# Patient Record
Sex: MALE | URBAN - METROPOLITAN AREA
[De-identification: names, ages, dates, MRNs, and addresses within clinical notes are randomized per-mention and may not be internally consistent; named-entity substitution may affect disease eponyms.]

---

## 2020-04-08 ENCOUNTER — TELEMEDICINE CONVERTED (OUTPATIENT)
Dept: NEUROLOGY | Facility: CLINIC | Age: 25
End: 2020-04-08
Attending: PSYCHIATRY & NEUROLOGY

## 2021-05-10 NOTE — H&P
History and Physical      Patient Name: Pete Carmona   Patient ID: [id number]   Sex: Male   YOB: 1995        Visit Date: April 8, 2020    Provider: Haider Martínez MD   Location: [facility]   Location Address: [address] [place], [place] [address]  [id number]   Location Phone: [phone]          Chief Complaint     New pt via Zoom for headaches.       History Of Present Illness  Pete Carmona is a 24 year old male who presents today to [facility] today referred from Chester Asif MD.      24-year-old man telemetry video valuation for headaches and apraxia.  He states that he had a headache for 2 weeks and it went away.  Was only left-sided.  It was pressure and it did not go to the back of his head.  It is only in the left parietal area.  He had a CT scan of brain which was unremarkable.  He has no history of migraine headaches.  He does not have any headaches that are severe, incapacitating associated with light and noise sensitivity, nausea or vomiting.    His mother is with him today.  His mother states that he was brought to [place] with his grandmother to be evaluated for speech and language difficulty.  He was developmentally normal in his milestones however his speech always lagging behind.  He was told in [place] that he had apraxia.  Nothing else was explained to them.  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  He did not finish regular high school.  He was in special ed.  He is able to do all activities of daily living.  He has difficulty reading.  His mother states that he has no problems operating the remote control or doing activities of daily living.       Past Medical History  Headache         Past Surgical History  Ear Tubes         Allergy List  NO KNOWN DRUG ALLERGIES         Social History  Alcohol (Never); Tobacco (Never)         Review of Systems  · Constitutional  o Denies  o : chills, excessive sweating, fatigue, fever,  sycope/passing out, weight gain, weight loss  · Eyes  o Denies  o : changes in vision, blurry vision, double vision  · HENT  o Denies  o : loss of hearing, ringing in the ears, ear aches, sore throat, nasal congestion, sinus pain, nose bleeds, seasonal allergies  · Cardiovascular  o Denies  o : blood clots, swollen legs, anemia, easy burising or bleeding, transfusions  · Respiratory  o Denies  o : shortness of breath, dry cough, productive cough, pneumonia, COPD  · Gastrointestinal  o Denies  o : difficulty swallowing, reflux  · Genitourinary  o Denies  o : incontinence  · Neurologic  o Admits  o : headache  o Denies  o : seizure, stroke, tremor, loss of balance, falls, dizziness/vertigo, difficulty with sleep, numbness/tingling/paresthesia , difficulty with coordination, difficulty with dexterity, weakness  · Musculoskeletal  o Denies  o : neck stiffness/pain, swollen lymph nodes, muscle aches, joint pain, weakness, spasms, sciatica, pain radiating in arm, pain radiating in leg, low back pain  · Endocrine  o Denies  o : diabetes, thyroid disorder  · Psychiatric  o Denies  o : anxiety, depression      Physical Examination     He is alert, fluent, phasic, follows commands well.  He is fully oriented to place and time.  Immediate memory is 3 out of 3, reading and following written commands (closure eyes), naming, repeating is intact.  He can tell me the president [place] as well as previous president [place].  He has a language impediment.  He difficulty with pronunciation.  He has difficulty in repeating a long phrase.  He is able to give me recent events without any difficulty.  He can tell me about the coronavirus and general knowledge questions about the details.  Sentence structure is intact.           Assessment  · Language development disorder     315.31/F80.9  He has speech and language developmental disorder. I told the mother that there is nothing different we can do about that. He must have gone  through speech therapy in the past.  · Headache     784.0/R51  His headaches have resolved. I told the patient and his mother to follow-up with us and make another appointment should he have recurrent headaches in the future.    Problems Reconciled  Plan  · Medications  o Medications have been Reconciled  o Transition of Care or Provider Policy  · Instructions  o Encouraged to follow-up with Primary Care Provider for preventative care.  · Disposition  o Follow Up PRN.            Electronically Signed by: Haider Martínez MD -Author on April 8, 2020 12:41:45 PM

## 2024-04-29 ENCOUNTER — HOSPITAL ENCOUNTER (OUTPATIENT)
Dept: OTHER | Facility: HOSPITAL | Age: 29
Discharge: HOME | End: 2024-04-29

## 2024-05-20 ENCOUNTER — TELEPHONE (OUTPATIENT)
Dept: NEUROSURGERY | Facility: CLINIC | Age: 29
End: 2024-05-20
Payer: MEDICAID

## 2024-06-18 ENCOUNTER — PATIENT ROUNDING (BHMG ONLY) (OUTPATIENT)
Dept: NEUROSURGERY | Facility: CLINIC | Age: 29
End: 2024-06-18
Payer: MEDICAID

## 2024-06-18 ENCOUNTER — OFFICE VISIT (OUTPATIENT)
Dept: NEUROSURGERY | Facility: CLINIC | Age: 29
End: 2024-06-18
Payer: MEDICAID

## 2024-06-18 VITALS
HEART RATE: 90 BPM | SYSTOLIC BLOOD PRESSURE: 126 MMHG | DIASTOLIC BLOOD PRESSURE: 74 MMHG | WEIGHT: 212.9 LBS | HEIGHT: 71 IN | BODY MASS INDEX: 29.81 KG/M2

## 2024-06-18 DIAGNOSIS — M51.36 DEGENERATIVE DISC DISEASE, LUMBAR: ICD-10-CM

## 2024-06-18 DIAGNOSIS — M54.42 CHRONIC LEFT-SIDED LOW BACK PAIN WITH LEFT-SIDED SCIATICA: Primary | ICD-10-CM

## 2024-06-18 DIAGNOSIS — G89.29 CHRONIC LEFT-SIDED LOW BACK PAIN WITH LEFT-SIDED SCIATICA: Primary | ICD-10-CM

## 2024-06-18 PROCEDURE — 99203 OFFICE O/P NEW LOW 30 MIN: CPT | Performed by: NURSE PRACTITIONER

## 2024-06-18 PROCEDURE — 1159F MED LIST DOCD IN RCRD: CPT | Performed by: NURSE PRACTITIONER

## 2024-06-18 PROCEDURE — 1160F RVW MEDS BY RX/DR IN RCRD: CPT | Performed by: NURSE PRACTITIONER

## 2024-06-18 RX ORDER — CEFDINIR 300 MG/1
2 CAPSULE ORAL DAILY
COMMUNITY
Start: 2024-06-11

## 2024-06-18 RX ORDER — METHOCARBAMOL 500 MG/1
500 TABLET, FILM COATED ORAL 3 TIMES DAILY
Qty: 90 TABLET | Refills: 1 | Status: SHIPPED | OUTPATIENT
Start: 2024-06-18

## 2024-06-18 NOTE — PROGRESS NOTES
"Chief Complaint  Back Pain ([facility]. Low back pain with one occurrence that radiated down left leg. PT did not help with pain.)    Subjective          Pete Carmona who is a 28 y.o. year old male who presents to [facility] for evaluation of lumbar spine.      The patient complains of pain located in the lumbar spine.  Patients states the pain has been present for several months.  The pain came on gradually.  Pain is primarily in the midline of the low back. The pain scale level is 7.  The pain does radiate. Dermatomes are located on left Lumbar at: not below the knee..  The pain is constant and described as aching.  The pain is worse at no particular time of day. Patient states bending, twisting, lifting, and jarring activities makes the pain worse.  Patient states rest makes the pain better.    Associated Symptoms Include: Denies numbness and tingling  Conservative Interventions Include: Physical Therapy that was not very effective., NSAIDs that were somewhat effective., and Tylenol.    Was this the result of an injury or accident? : No    History of Previous Spinal Surgery?: No    Nicotine use: non-smoker    BMI: Body mass index is 29.69 kg/m².      Review of Systems   Musculoskeletal:  Positive for arthralgias, back pain and myalgias.   All other systems reviewed and are negative.       Objective   Vital Signs:   /74 (BP Location: Left arm, Patient Position: Sitting, Cuff Size: Adult)   Pulse 90   Ht 180.3 cm (71\")   Wt 96.6 kg (212 lb 14.4 oz)   BMI 29.69 kg/m²       Physical Exam  Vitals reviewed.   Constitutional:       Appearance: Normal appearance.   Musculoskeletal:      Lumbar back: Tenderness present. Negative right straight leg raise test and negative left straight leg raise test.      Right hip: No tenderness. Normal range of motion.      Left hip: No tenderness. Normal range of motion.   Neurological:      Mental Status: He is alert and oriented to " person, place, and time.      Motor: Motor strength is normal.     Gait: Gait is intact.      Deep Tendon Reflexes:      Reflex Scores:       Patellar reflexes are 2+ on the right side and 2+ on the left side.       Achilles reflexes are 2+ on the right side and 2+ on the left side.       Neurologic Exam     Mental Status   Oriented to person, place, and time.   Level of consciousness: alert    Motor Exam   Muscle bulk: normal  Overall muscle tone: normal    Strength   Strength 5/5 throughout.     Sensory Exam   Light touch normal.     Gait, Coordination, and Reflexes     Gait  Gait: normal    Reflexes   Right patellar: 2+  Left patellar: 2+  Right achilles: 2+  Left achilles: 2+  Right ankle clonus: absent  Left ankle clonus: absent       Result Review :       Data reviewed : Radiologic studies MRI Lumbar Spine on 4/29/24 at [facility] personally reviewed and interpreted. Mild disc degeneration at L5/S1, without spinal canal or foraminal stenosis. Otherwise unremarkable.            Assessment and Plan    Diagnoses and all orders for this visit:    1. Chronic left-sided low back pain with left-sided sciatica (Primary)  -     Ambulatory Referral to Pain Management  -     methocarbamol (ROBAXIN) 500 MG tablet; Take 1 tablet by mouth 3 (Three) Times a Day.  Dispense: 90 tablet; Refill: 1  -     diclofenac (VOLTAREN) 50 MG EC tablet; Take 1 tablet by mouth 2 (Two) Times a Day As Needed (pain).  Dispense: 60 tablet; Refill: 1    2. Degenerative disc disease, lumbar  -     Ambulatory Referral to Pain Management    Pt presenting for evaluation of low back pain with left sided sciatica. We reviewed his MRI lumbar spine, demonstrating mild disc degeneration at L5/S1. There is no significant spinal canal or foraminal stenosis, which is reassuring. Exam demonstrates tenderness to palpation in several myofascial trigger points. Will refer to pain management for lumbar TPIs. Could consider LESI in the future.     Will start  diclofenac and robaxin as needed for pain.     Follow up as needed.       Follow Up   Return if symptoms worsen or fail to improve.  Patient was given instructions and counseling regarding his condition or for health maintenance advice.     -Referral to pain management  -Diclofenac 50 mg twice daily  -Robaxin 500 mg every 8 hours as needed  -Follow up as needed

## 2024-06-18 NOTE — PATIENT INSTRUCTIONS
-Referral to pain management  -Diclofenac 50 mg twice daily  -Robaxin 500 mg every 8 hours as needed  -Follow up as needed